# Patient Record
Sex: MALE | Race: WHITE | NOT HISPANIC OR LATINO | URBAN - METROPOLITAN AREA
[De-identification: names, ages, dates, MRNs, and addresses within clinical notes are randomized per-mention and may not be internally consistent; named-entity substitution may affect disease eponyms.]

---

## 2020-01-13 ENCOUNTER — EMERGENCY (EMERGENCY)
Facility: HOSPITAL | Age: 60
LOS: 1 days | Discharge: HOME | End: 2020-01-13
Attending: EMERGENCY MEDICINE | Admitting: EMERGENCY MEDICINE
Payer: OTHER MISCELLANEOUS

## 2020-01-13 VITALS
DIASTOLIC BLOOD PRESSURE: 79 MMHG | HEART RATE: 95 BPM | OXYGEN SATURATION: 99 % | TEMPERATURE: 99 F | SYSTOLIC BLOOD PRESSURE: 149 MMHG | RESPIRATION RATE: 16 BRPM

## 2020-01-13 VITALS
OXYGEN SATURATION: 99 % | RESPIRATION RATE: 18 BRPM | TEMPERATURE: 98 F | HEIGHT: 70 IN | SYSTOLIC BLOOD PRESSURE: 148 MMHG | DIASTOLIC BLOOD PRESSURE: 88 MMHG | WEIGHT: 225.09 LBS | HEART RATE: 55 BPM

## 2020-01-13 PROCEDURE — 24650 CLTX RDL HEAD/NCK FX WO MNPJ: CPT | Mod: 54,LT

## 2020-01-13 PROCEDURE — 72125 CT NECK SPINE W/O DYE: CPT | Mod: 26

## 2020-01-13 PROCEDURE — 73080 X-RAY EXAM OF ELBOW: CPT | Mod: 26,LT

## 2020-01-13 PROCEDURE — 70450 CT HEAD/BRAIN W/O DYE: CPT | Mod: 26

## 2020-01-13 PROCEDURE — 99284 EMERGENCY DEPT VISIT MOD MDM: CPT | Mod: 25,57

## 2020-01-13 PROCEDURE — 12011 RPR F/E/E/N/L/M 2.5 CM/<: CPT | Mod: XU

## 2020-01-13 RX ORDER — LIDOCAINE HCL 20 MG/ML
3 VIAL (ML) INJECTION ONCE
Refills: 0 | Status: COMPLETED | OUTPATIENT
Start: 2020-01-13 | End: 2020-01-13

## 2020-01-13 RX ORDER — IBUPROFEN 200 MG
1 TABLET ORAL
Qty: 20 | Refills: 0
Start: 2020-01-13

## 2020-01-13 RX ORDER — TETANUS TOXOID, REDUCED DIPHTHERIA TOXOID AND ACELLULAR PERTUSSIS VACCINE, ADSORBED 5; 2.5; 8; 8; 2.5 [IU]/.5ML; [IU]/.5ML; UG/.5ML; UG/.5ML; UG/.5ML
0.5 SUSPENSION INTRAMUSCULAR ONCE
Refills: 0 | Status: COMPLETED | OUTPATIENT
Start: 2020-01-13 | End: 2020-01-13

## 2020-01-13 RX ORDER — ACETAMINOPHEN WITH CODEINE 300MG-30MG
1 TABLET ORAL
Qty: 20 | Refills: 0
Start: 2020-01-13

## 2020-01-13 RX ORDER — OXYCODONE AND ACETAMINOPHEN 5; 325 MG/1; MG/1
1 TABLET ORAL ONCE
Refills: 0 | Status: DISCONTINUED | OUTPATIENT
Start: 2020-01-13 | End: 2020-01-13

## 2020-01-13 RX ADMIN — TETANUS TOXOID, REDUCED DIPHTHERIA TOXOID AND ACELLULAR PERTUSSIS VACCINE, ADSORBED 0.5 MILLILITER(S): 5; 2.5; 8; 8; 2.5 SUSPENSION INTRAMUSCULAR at 11:03

## 2020-01-13 RX ADMIN — OXYCODONE AND ACETAMINOPHEN 1 TABLET(S): 5; 325 TABLET ORAL at 11:04

## 2020-01-13 RX ADMIN — Medication 3 MILLILITER(S): at 11:03

## 2020-01-13 NOTE — ED ADULT TRIAGE NOTE - CHIEF COMPLAINT QUOTE
Patient to ED s/p fall from ladder of 10-15 feet.  Patient landed on feet and then side.  Complaining of left elbow pain.  Arrives in cervical collar.  Denies LOC or head pain.  Abrasion to lip.

## 2020-01-13 NOTE — ED PROVIDER NOTE - PROGRESS NOTE DETAILS
Contacted Dr. Dozier, orthopedics, to discuss elbow xrays. Left voicemail to request a call back to discuss the case and review xray together. Dr. Dozier's PA ([place]) called back, requesting images and will call back. Discussed with Dr. Dozier's team, they will see patient tomorrow on the 6th floor and recommend a sling or posterior splint, leaving it to patients preference.

## 2020-01-13 NOTE — ED PROCEDURE NOTE - CPROC ED POST PROC CARE GUIDE1
Elevate the injured extremity as instructed./Keep the cast/splint/dressing clean and dry./Verbal/written post procedure instructions were given to patient/caregiver./Instructed patient/caregiver regarding signs and symptoms of infection./Instructed patient/caregiver to follow-up with primary care physician.
Instructed patient/caregiver regarding signs and symptoms of infection./Keep the cast/splint/dressing clean and dry.

## 2020-01-13 NOTE — ED ADULT TRIAGE NOTE - BP NONINVASIVE SYSTOLIC (MM HG)
[address])  Orthopaedic Surgery  [address]  [place], [place] [address]  Phone: [phone]  Fax: [phone]  Follow Up Time:

## 2020-01-13 NOTE — ED ADULT NURSE NOTE - NSIMPLEMENTINTERV_GEN_ALL_ED
Implemented All Fall Risk Interventions:  [place] to call system. Call bell, personal items and telephone within reach. Instruct patient to call for assistance. Room bathroom lighting operational. Non-slip footwear when patient is off stretcher. Physically safe environment: no spills, clutter or unnecessary equipment. Stretcher in lowest position, wheels locked, appropriate side rails in place. Provide visual cue, wrist band, yellow gown, etc. Monitor gait and stability. Monitor for mental status changes and reorient to person, place, and time. Review medications for side effects contributing to fall risk. Reinforce activity limits and safety measures with patient and family.

## 2020-01-13 NOTE — ED PROVIDER NOTE - PATIENT PORTAL LINK FT
You can access the FollowMyHealth Patient Portal offered by [facility] by registering at the following website: [email or web address]. By joining [employer]’s FollowMyHealth portal, you will also be able to view your health information using other applications (apps) compatible with our system.

## 2020-01-13 NOTE — ED PROVIDER NOTE - CARE PROVIDER_API CALL
Vazquez Dozier)  Orthopaedic Surgery  [address] [place] [address]  Phone: [phone]  Fax: [phone]  Follow Up Time: Urgent

## 2020-01-13 NOTE — ED PROVIDER NOTE - DIAGNOSTIC INTERPRETATION
Radiology Interpretation performed  by ED physician Keri   Left elbow x-ray, 3 views  Radial head fracture, and soft tissue swelling. No dislocation and no foreign body.

## 2020-01-13 NOTE — ED PROVIDER NOTE - CARE PLAN
Principal Discharge DX:	Closed displaced fracture of head of left radius, initial encounter  Secondary Diagnosis:	Lip laceration, initial encounter

## 2020-01-13 NOTE — ED PROVIDER NOTE - NSFOLLOWUPINSTRUCTIONS_ED_ALL_ED_FT
PLEASE FOLLOW-UP WITH DR. BECK OR YOUR PRIVATE ORTHOPEDIST TOMORROW.  DR. BECK CAN SEE YOU AT 1PM HERE ON THE 6TH FLOOR.  YOU MUST FOLLOW-UP BECAUSE FAILURE TO DO SO MAY RESULT IN PERMANENT DIABILITY OF YOUR LEFT ELBOW.     THE STITICHES PLACED TO YOUR LIP WILL DISSOLVE ON THEIR OWN.     . PLEASE FOLLOW-UP WITH DR. BECK OR YOUR PRIVATE ORTHOPEDIST TOMORROW.  DR. BECK CAN SEE YOU AT 1PM HERE ON THE 6TH FLOOR.  YOU MUST FOLLOW-UP BECAUSE FAILURE TO DO SO MAY RESULT IN PERMANENT DISABILITY OF YOUR LEFT ELBOW.     THE STITCHES PLACED TO YOUR LIP WILL DISSOLVE ON THEIR OWN.     Elbow Fracture    WHAT YOU NEED TO KNOW:    An elbow fracture is a break in one or more of the 3 bones that form your elbow joint. Osteoporosis (brittle bones) can increase your risk for an elbow fracture.         DISCHARGE INSTRUCTIONS:    Return to the emergency department if:     Your skin becomes swollen, cold, or pale.      Your elbow, hand, or fingers are numb.    Call your doctor if:     You have a fever.      The pain gets worse, even after you rest and take your medicine.      You have new or more trouble moving your arm.      You have new sores around the area of your brace, splint, or cast.      Your brace, splint, or cast becomes damaged.      You have questions or concerns about your condition or care.    Medicines: You may need any of the following:     Prescription pain medicine may be given. Ask your healthcare provider how to take this medicine safely. Some prescription pain medicines contain acetaminophen. Do not take other medicines that contain acetaminophen without talking to your healthcare provider. Too much acetaminophen may cause liver damage. Prescription pain medicine may cause constipation. Ask your healthcare provider how to prevent or treat constipation.       NSAIDs, such as ibuprofen, help decrease swelling, pain, and fever. This medicine is available with or without a doctor's order. NSAIDs can cause stomach bleeding or kidney problems in certain people. If you take blood thinner medicine, always ask your healthcare provider if NSAIDs are safe for you. Always read the medicine label and follow directions.      Take your medicine as directed. Contact your healthcare provider if you think your medicine is not helping or if you have side effects. Tell him or her if you are allergic to any medicine. Keep a list of the medicines, vitamins, and herbs you take. Include the amounts, and when and why you take them. Bring the list or the pill bottles to follow-up visits. Carry your medicine list with you in case of an emergency.    Self-care:     Elevate your elbow above the level of your heart as often as you can. This will help decrease swelling and pain. Prop your elbow on pillows or blankets to keep it elevated comfortably. While your elbow is elevated, wiggle your fingers and open and close them to prevent hand stiffness.           Apply ice on your elbow on your elbow for 15 to 20 minutes every hour or as directed. Use an ice pack, or put crushed ice in a plastic bag. Cover it with a towel. Ice helps prevent tissue damage and decreases swelling and pain.      Go to physical therapy as directed. A physical therapist can teach you exercises to help improve movement and strength and to decrease pain.    Care for your brace, cast, or splint: Follow instructions about when you may take a bath or shower. It is important not to get your brace, cast, or splint wet. Cover your device with a plastic bag before you bathe. Tape the bag to your skin above the device to help keep out water. Hold your elbow away from the water in case the bag breaks.    Check the skin around your cast, brace, or splint daily for any redness or open skin.      Do not use a sharp or pointed object to scratch your skin under the cast, brace, or splint.      Do not remove your brace or splint unless directed.    Follow up with your doctor as directed: You may need to see a specialist. You may need more x-rays and treatment. Write down your questions so you remember to ask them during your visits. PLEASE FOLLOW-UP WITH DR. BECK OR YOUR PRIVATE ORTHOPEDIST TOMORROW.  DR. BECK CAN SEE YOU AT 1PM HERE ON THE 6TH FLOOR.  PLEASE DO NOT GO TO THE ADDRESS LISTED FOR HIM ABOVE.  HOWEVER HIS PHONE NUMBER IS THE SAME.  YOU MUST FOLLOW-UP BECAUSE FAILURE TO DO SO MAY RESULT IN PERMANENT DISABILITY OF YOUR LEFT ELBOW.     THE STITCHES PLACED TO YOUR LIP WILL DISSOLVE ON THEIR OWN.     Elbow Fracture    WHAT YOU NEED TO KNOW:    An elbow fracture is a break in one or more of the 3 bones that form your elbow joint. Osteoporosis (brittle bones) can increase your risk for an elbow fracture.         DISCHARGE INSTRUCTIONS:    Return to the emergency department if:     Your skin becomes swollen, cold, or pale.      Your elbow, hand, or fingers are numb.    Call your doctor if:     You have a fever.      The pain gets worse, even after you rest and take your medicine.      You have new or more trouble moving your arm.      You have new sores around the area of your brace, splint, or cast.      Your brace, splint, or cast becomes damaged.      You have questions or concerns about your condition or care.    Medicines: You may need any of the following:     Prescription pain medicine may be given. Ask your healthcare provider how to take this medicine safely. Some prescription pain medicines contain acetaminophen. Do not take other medicines that contain acetaminophen without talking to your healthcare provider. Too much acetaminophen may cause liver damage. Prescription pain medicine may cause constipation. Ask your healthcare provider how to prevent or treat constipation.       NSAIDs, such as ibuprofen, help decrease swelling, pain, and fever. This medicine is available with or without a doctor's order. NSAIDs can cause stomach bleeding or kidney problems in certain people. If you take blood thinner medicine, always ask your healthcare provider if NSAIDs are safe for you. Always read the medicine label and follow directions.      Take your medicine as directed. Contact your healthcare provider if you think your medicine is not helping or if you have side effects. Tell him or her if you are allergic to any medicine. Keep a list of the medicines, vitamins, and herbs you take. Include the amounts, and when and why you take them. Bring the list or the pill bottles to follow-up visits. Carry your medicine list with you in case of an emergency.    Self-care:     Elevate your elbow above the level of your heart as often as you can. This will help decrease swelling and pain. Prop your elbow on pillows or blankets to keep it elevated comfortably. While your elbow is elevated, wiggle your fingers and open and close them to prevent hand stiffness.           Apply ice on your elbow on your elbow for 15 to 20 minutes every hour or as directed. Use an ice pack, or put crushed ice in a plastic bag. Cover it with a towel. Ice helps prevent tissue damage and decreases swelling and pain.      Go to physical therapy as directed. A physical therapist can teach you exercises to help improve movement and strength and to decrease pain.    Care for your brace, cast, or splint: Follow instructions about when you may take a bath or shower. It is important not to get your brace, cast, or splint wet. Cover your device with a plastic bag before you bathe. Tape the bag to your skin above the device to help keep out water. Hold your elbow away from the water in case the bag breaks.    Check the skin around your cast, brace, or splint daily for any redness or open skin.      Do not use a sharp or pointed object to scratch your skin under the cast, brace, or splint.      Do not remove your brace or splint unless directed.    Follow up with your doctor as directed: You may need to see a specialist. You may need more x-rays and treatment. Write down your questions so you remember to ask them during your visits.

## 2020-01-13 NOTE — ED ADULT NURSE NOTE - CHIEF COMPLAINT QUOTE
Patient to ED s/p fall from ladder of 10-15 feet.  Patient landed on feet and then side.  Complaining of left elbow pain.  Arrives in cervical collar.  Denies LOC or head pain.  Abrasion to lip. DISCHARGE

## 2020-01-13 NOTE — ED PROVIDER NOTE - PHYSICAL EXAMINATION
VITAL SIGNS: I have reviewed nursing notes and confirm.  CONSTITUTIONAL: Well-developed; well-nourished; appears in pain with LUE in sling and c-collar in place.   SKIN: Skin is warm and dry, no acute rash.  +1.5cm lac to internal L upper lip.   HEAD: Normocephalic; atraumatic.  EYES: PERRL, EOM intact; conjunctiva and sclera clear.  ENT: No nasal discharge; airway clear.  NECK: Supple; non tender. C-collar in place.   CARD: S1, S2 normal; no murmurs, gallops, or rubs. Regular rate and rhythm.  RESP: No wheezes, rales or rhonchi.  ABD: Normal bowel sounds; soft; non-distended; non-tender; no hepatosplenomegaly.  MSK: L elbow - limited ROM 2/2 pain.  +TTP over radial head and with with attempts to pronate/supinate.  NVID.  All other joints/extremities examined and normal ROM with no deformity or TTP.   NEURO: Patient is alert, oriented x person, place and time.  Cranial nerves 2-12 are intact.  Normal gait and speech.  Cerebellar testing normal:  negative Romberg, normal coordination and normal finger to nose, heal to shin and rapid alternating movements.  Normal proprioception and sensory exam.  No pronator drift.  5/5 bl upper extremity and lower extremity strength.  PSYCH: Cooperative, appropriate.

## 2020-01-13 NOTE — ED PROVIDER NOTE - OBJECTIVE STATEMENT
60 y/o Male with no PMHx BIBEMS for a fall while at work. Pt fell 10-15 feet landing on his left arm/elbow, then hit his face with his hand while trying to protect his head from hitting the floor. Pain was witnessed by co-worker. Pt reports moderate left upper leg pain but reports pain is mostly to the left elbow/arm. Denies numbness, tingling, N/V, abdominal pain, and LOC. NKDA. No tobacco use. No daily medications. Last Tetanus unknown. 60 y/o Male with no PMHx BIBEMS for a fall while at work. Pt fell off a ladder about 10-15 feet landing on his left arm/elbow, then hit his face with his hand while trying to protect his head from hitting the floor. Pain was witnessed by co-worker. Pt reports moderate left upper leg pain but reports pain is mostly to the left elbow/arm. Denies numbness, tingling, N/V, abdominal pain, and LOC. NKDA. No tobacco use. No daily medications. Last Tetanus unknown.

## 2020-01-13 NOTE — ED PROVIDER NOTE - CLINICAL SUMMARY MEDICAL DECISION MAKING FREE TEXT BOX
Fall.  +head injury, in c-collar, +elbow injury, +lip laceration.  No other injuries noted on exam.   Plan - HCT, C-spine CT, lip lac repair, xray of L elbow.     CTs clear.  Lac repaired.  +Radial head fracture on xray.  Consulted ortho and recommended posterior splint and f/u tomorrow.  SPlint performed by APRIL Nicholson.  Discussed all dx at length with pt and his son.
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
## 2020-01-19 DIAGNOSIS — S52.122A DISPLACED FRACTURE OF HEAD OF LEFT RADIUS, INITIAL ENCOUNTER FOR CLOSED FRACTURE: ICD-10-CM
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
## 2020-01-19 DIAGNOSIS — S01.511A LACERATION WITHOUT FOREIGN BODY OF LIP, INITIAL ENCOUNTER: ICD-10-CM
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
## 2020-01-19 DIAGNOSIS — Z23 ENCOUNTER FOR IMMUNIZATION: ICD-10-CM
